# Patient Record
Sex: FEMALE | Race: BLACK OR AFRICAN AMERICAN | NOT HISPANIC OR LATINO
[De-identification: names, ages, dates, MRNs, and addresses within clinical notes are randomized per-mention and may not be internally consistent; named-entity substitution may affect disease eponyms.]

---

## 2017-06-09 ENCOUNTER — RESULT REVIEW (OUTPATIENT)
Age: 37
End: 2017-06-09

## 2019-05-30 ENCOUNTER — LABORATORY RESULT (OUTPATIENT)
Age: 39
End: 2019-05-30

## 2019-05-30 ENCOUNTER — APPOINTMENT (OUTPATIENT)
Dept: SURGERY | Facility: CLINIC | Age: 39
End: 2019-05-30
Payer: COMMERCIAL

## 2019-05-30 VITALS
BODY MASS INDEX: 28.49 KG/M2 | TEMPERATURE: 97.9 F | HEART RATE: 66 BPM | SYSTOLIC BLOOD PRESSURE: 117 MMHG | WEIGHT: 188 LBS | DIASTOLIC BLOOD PRESSURE: 78 MMHG | HEIGHT: 68 IN

## 2019-05-30 DIAGNOSIS — D64.9 ANEMIA, UNSPECIFIED: ICD-10-CM

## 2019-05-30 DIAGNOSIS — Z78.9 OTHER SPECIFIED HEALTH STATUS: ICD-10-CM

## 2019-05-30 DIAGNOSIS — Z80.3 FAMILY HISTORY OF MALIGNANT NEOPLASM OF BREAST: ICD-10-CM

## 2019-05-30 DIAGNOSIS — K80.20 CALCULUS OF GALLBLADDER W/OUT CHOLECYSTITIS W/OUT OBSTRUCTION: ICD-10-CM

## 2019-05-30 PROCEDURE — 99243 OFF/OP CNSLTJ NEW/EST LOW 30: CPT

## 2019-05-30 NOTE — HISTORY OF PRESENT ILLNESS
[de-identified] : 39 y.o F presents w the cc of having abdominal, epigastric discomfort. Patient states she had this pain, on and off over the past 10 years. She is also experiencing\par nausea, and vomiting after eating meals. Patient had an Abdominal US done, last year in 2018 which had showed cholelithiasis.

## 2019-05-30 NOTE — PLAN
[FreeTextEntry1] : Patient with symptomatic gallstones and abdominal pain. Had a long d/w the pt. All the options, benefits and risks of laparoscopic cholecystectomy was discussed. The potential to go open, the small potential for bleeding, CBD injury, bile leak and other related complications were discussed. All the questions were answered to her satisfaction. Will schedule at [facility] [place]\par Also has an umbilical hernia and can be repaired at the same time

## 2019-05-30 NOTE — REVIEW OF SYSTEMS
[Fever] : no fever [Chills] : no chills [Nosebleeds] : no nosebleeds [Chest Pain] : no chest pain [Cough] : no cough [Pelvic Pain] : no pelvic pain [Joint Swelling] : no joint swelling [Joint Stiffness] : no joint stiffness [Skin Lesions] : no skin lesions [Skin Wound] : no skin wound [Dizziness] : no dizziness [Anxiety] : no anxiety [Muscle Weakness] : no muscle weakness [Swollen Glands] : no swollen glands [FreeTextEntry7] : has abdominal pain with nausea and vomiting after eating meals , on and off over the last 10 years

## 2019-05-30 NOTE — CONSULT LETTER
[Dear  ___] : Dear  [unfilled], [Consult Closing:] : Thank you very much for allowing me to participate in the care of this patient.  If you have any questions, please do not hesitate to contact me. [Sincerely,] : Sincerely, [FreeTextEntry3] : Washington Andino MD\par  nose with dressing and packing

## 2019-05-30 NOTE — PHYSICAL EXAM
[Normal Breath Sounds] : Normal breath sounds [Normal Rate and Rhythm] : normal rate and rhythm [No Rash or Lesion] : No rash or lesion [Alert] : alert [Oriented to Person] : oriented to person [Oriented to Place] : oriented to place [Oriented to Time] : oriented to time [Calm] : calm [de-identified] : A/Ox3; NAD. appears comfortable  [de-identified] : EOMI [de-identified] : supple, no JVD [de-identified] : abd is soft, NT/ND; she has a small umbilical hernia  [de-identified] : no LE edema

## 2019-06-05 ENCOUNTER — OUTPATIENT (OUTPATIENT)
Dept: OUTPATIENT SERVICES | Facility: HOSPITAL | Age: 39
LOS: 1 days | End: 2019-06-05
Payer: COMMERCIAL

## 2019-06-05 VITALS
HEART RATE: 67 BPM | DIASTOLIC BLOOD PRESSURE: 70 MMHG | SYSTOLIC BLOOD PRESSURE: 110 MMHG | WEIGHT: 186.95 LBS | RESPIRATION RATE: 16 BRPM | OXYGEN SATURATION: 98 % | HEIGHT: 68 IN | TEMPERATURE: 98 F

## 2019-06-05 DIAGNOSIS — Z01.818 ENCOUNTER FOR OTHER PREPROCEDURAL EXAMINATION: ICD-10-CM

## 2019-06-05 DIAGNOSIS — K80.20 CALCULUS OF GALLBLADDER WITHOUT CHOLECYSTITIS WITHOUT OBSTRUCTION: ICD-10-CM

## 2019-06-05 DIAGNOSIS — K42.9 UMBILICAL HERNIA WITHOUT OBSTRUCTION OR GANGRENE: ICD-10-CM

## 2019-06-05 LAB — HCG UR QL: NEGATIVE — SIGNIFICANT CHANGE UP

## 2019-06-05 PROCEDURE — 71046 X-RAY EXAM CHEST 2 VIEWS: CPT | Mod: 26

## 2019-06-05 PROCEDURE — 93005 ELECTROCARDIOGRAM TRACING: CPT

## 2019-06-05 PROCEDURE — G0463: CPT

## 2019-06-05 PROCEDURE — 81025 URINE PREGNANCY TEST: CPT

## 2019-06-05 PROCEDURE — 71046 X-RAY EXAM CHEST 2 VIEWS: CPT

## 2019-06-05 RX ORDER — SODIUM CHLORIDE 9 MG/ML
3 INJECTION INTRAMUSCULAR; INTRAVENOUS; SUBCUTANEOUS EVERY 8 HOURS
Refills: 0 | Status: DISCONTINUED | OUTPATIENT
Start: 2019-06-12 | End: 2019-06-20

## 2019-06-05 NOTE — H&P PST ADULT - NSANTHOSAYNRD_GEN_A_CORE
No. DORCAS screening performed.  STOP BANG Legend: 0-2 = LOW Risk; 3-4 = INTERMEDIATE Risk; 5-8 = HIGH Risk

## 2019-06-05 NOTE — H&P PST ADULT - MUSCULOSKELETAL
details… detailed exam no joint erythema/no calf tenderness/no joint warmth/no joint swelling/normal strength/ROM intact/normal

## 2019-06-05 NOTE — H&P PST ADULT - NEGATIVE GENERAL GENITOURINARY SYMPTOMS
no nocturia/no renal colic/no flank pain L/no gas in urine/no flank pain R/no urine discoloration/no dysuria/no incontinence/no bladder infections/no urinary hesitancy/no hematuria/normal urinary frequency

## 2019-06-05 NOTE — H&P PST ADULT - NEGATIVE CARDIOVASCULAR SYMPTOMS
no chest pain/no palpitations/no dyspnea on exertion/no orthopnea/no paroxysmal nocturnal dyspnea/no claudication/no peripheral edema

## 2019-06-05 NOTE — H&P PST ADULT - NEGATIVE BREAST SYMPTOMS
no nipple discharge R/no breast lump L/no breast lump R/no breast tenderness R/no nipple discharge L/no breast tenderness L

## 2019-06-05 NOTE — H&P PST ADULT - ASSESSMENT
39 years old female presented  with Calculus of gallbladder without cholecystitis without obstruction, Umbilical hernia without obstruction and without gangrene.

## 2019-06-05 NOTE — H&P PST ADULT - NSICDXPASTMEDICALHX_GEN_ALL_CORE_FT
PAST MEDICAL HISTORY:  Anemia hx of    Breast cyst, right hx of    Calculus of gallbladder without cholecystitis without obstruction     Hemorrhoids     History of hyperprolactinemia prolactin level - normal    Positive PPD, treated 10 years ago, pt had BCG vaccine in her country in childhood    Umbilical hernia without obstruction and without gangrene

## 2019-06-05 NOTE — H&P PST ADULT - RS GEN PE MLT RESP DETAILS PC
clear to auscultation bilaterally/airway patent/no rhonchi/no rales/breath sounds equal/good air movement/no wheezes/respirations non-labored

## 2019-06-05 NOTE — H&P PST ADULT - HISTORY OF PRESENT ILLNESS
39 years old female presented to [facility] for evaluation before surgery, pt was diagnosed with Calculus of gallbladder without cholecystitis without obstruction, Umbilical hernia without obstruction and without gangrene and is scheduled for laparoscopic cholecystectomy with repair of umbilical hernia on 6/12/19.

## 2019-06-05 NOTE — H&P PST ADULT - GASTROINTESTINAL COMMENTS
occasionally RUQ abdominal pain after eating, currently no pain, and umbilical area swelling umbilical hernia , soft, no tender, no RUQ abdominal tenderness, sx dx calculus of gallbladder

## 2019-06-05 NOTE — H&P PST ADULT - NEGATIVE ALLERGY TYPES
no outdoor environmental allergies/no indoor environmental allergies/no reactions to animals/no reactions to food/no reactions to insect bites

## 2019-06-05 NOTE — H&P PST ADULT - NEGATIVE OPHTHALMOLOGIC SYMPTOMS
no diplopia/no photophobia/no blurred vision R/no pain L/no pain R/no discharge L/no lacrimation L/no lacrimation R/no discharge R/no blurred vision L

## 2019-06-05 NOTE — H&P PST ADULT - NSICDXPROBLEM_GEN_ALL_CORE_FT
PROBLEM DIAGNOSES  Problem: Calculus of gallbladder without cholecystitis without obstruction  Assessment and Plan: Patient  is scheduled for laparoscopic cholecystectomy with repair of umbilical hernia on 6/12/19.     Problem: Umbilical hernia without obstruction and without gangrene  Assessment and Plan: Patient  is scheduled for laparoscopic cholecystectomy with repair of umbilical hernia on 6/12/19.

## 2019-06-05 NOTE — H&P PST ADULT - RESPIRATORY AND THORAX COMMENTS
Ruba Darling RN   Plan last modified Ruba Darling Dacia Ruiz MD [facility] [phone]            See the Encounter Report to view Anticoagulation Flowsheet and Dosing Calendar (Go to Encounters tab in chart review, and find the Anticoagulation Therapy Visit)    Dosage adjustment made based on physician directed care plan.    Ruba Darling RN                 
hx of PPD positive with tx done 10 years ago ( BCG vaccinations in pt's country), no cough, fatigue, no night sweating

## 2019-06-05 NOTE — H&P PST ADULT - NEGATIVE MUSCULOSKELETAL SYMPTOMS
no myalgia/no stiffness/no joint swelling/no muscle weakness/no arthritis/no arthralgia/no muscle cramps

## 2019-06-05 NOTE — H&P PST ADULT - NEGATIVE NEUROLOGICAL SYMPTOMS
no weakness/no syncope/no vertigo/no facial palsy/no transient paralysis/no paresthesias/no difficulty walking/no confusion/no focal seizures/no generalized seizures/no tremors/no loss of sensation/no loss of consciousness/no hemiparesis/no headache

## 2019-06-06 PROBLEM — Z78.9 NO PERTINENT PAST SURGICAL HISTORY: Status: RESOLVED | Noted: 2019-05-30 | Resolved: 2019-06-06

## 2019-06-06 PROBLEM — Z80.3 FAMILY HISTORY OF BREAST CANCER: Status: ACTIVE | Noted: 2019-05-30

## 2019-06-06 PROBLEM — D64.9 ANEMIA: Status: ACTIVE | Noted: 2019-05-30

## 2019-06-11 ENCOUNTER — TRANSCRIPTION ENCOUNTER (OUTPATIENT)
Age: 39
End: 2019-06-11

## 2019-06-12 ENCOUNTER — OUTPATIENT (OUTPATIENT)
Dept: OUTPATIENT SERVICES | Facility: HOSPITAL | Age: 39
LOS: 1 days | End: 2019-06-12
Payer: COMMERCIAL

## 2019-06-12 ENCOUNTER — APPOINTMENT (OUTPATIENT)
Dept: SURGERY | Facility: HOSPITAL | Age: 39
End: 2019-06-12

## 2019-06-12 ENCOUNTER — RESULT REVIEW (OUTPATIENT)
Age: 39
End: 2019-06-12

## 2019-06-12 VITALS
HEART RATE: 66 BPM | OXYGEN SATURATION: 99 % | RESPIRATION RATE: 17 BRPM | DIASTOLIC BLOOD PRESSURE: 67 MMHG | SYSTOLIC BLOOD PRESSURE: 143 MMHG | TEMPERATURE: 97 F

## 2019-06-12 VITALS
SYSTOLIC BLOOD PRESSURE: 116 MMHG | HEIGHT: 68 IN | WEIGHT: 186.95 LBS | HEART RATE: 94 BPM | OXYGEN SATURATION: 100 % | RESPIRATION RATE: 14 BRPM | TEMPERATURE: 98 F | DIASTOLIC BLOOD PRESSURE: 70 MMHG

## 2019-06-12 DIAGNOSIS — K42.9 UMBILICAL HERNIA WITHOUT OBSTRUCTION OR GANGRENE: ICD-10-CM

## 2019-06-12 DIAGNOSIS — K80.20 CALCULUS OF GALLBLADDER WITHOUT CHOLECYSTITIS WITHOUT OBSTRUCTION: ICD-10-CM

## 2019-06-12 LAB
BLD GP AB SCN SERPL QL: SIGNIFICANT CHANGE UP
HCG UR QL: NEGATIVE — SIGNIFICANT CHANGE UP

## 2019-06-12 PROCEDURE — 81025 URINE PREGNANCY TEST: CPT

## 2019-06-12 PROCEDURE — 86850 RBC ANTIBODY SCREEN: CPT

## 2019-06-12 PROCEDURE — 47562 LAPAROSCOPIC CHOLECYSTECTOMY: CPT | Mod: AS

## 2019-06-12 PROCEDURE — 88304 TISSUE EXAM BY PATHOLOGIST: CPT | Mod: 26

## 2019-06-12 PROCEDURE — 86901 BLOOD TYPING SEROLOGIC RH(D): CPT

## 2019-06-12 PROCEDURE — 47562 LAPAROSCOPIC CHOLECYSTECTOMY: CPT

## 2019-06-12 PROCEDURE — 36415 COLL VENOUS BLD VENIPUNCTURE: CPT

## 2019-06-12 PROCEDURE — 86900 BLOOD TYPING SEROLOGIC ABO: CPT

## 2019-06-12 PROCEDURE — 88304 TISSUE EXAM BY PATHOLOGIST: CPT

## 2019-06-12 PROCEDURE — 49585: CPT | Mod: AS

## 2019-06-12 PROCEDURE — 49585: CPT | Mod: 59

## 2019-06-12 RX ORDER — METOCLOPRAMIDE HCL 10 MG
10 TABLET ORAL ONCE
Refills: 0 | Status: COMPLETED | OUTPATIENT
Start: 2019-06-12 | End: 2019-06-12

## 2019-06-12 RX ORDER — KETOROLAC TROMETHAMINE 30 MG/ML
30 SYRINGE (ML) INJECTION EVERY 6 HOURS
Refills: 0 | Status: DISCONTINUED | OUTPATIENT
Start: 2019-06-12 | End: 2019-06-12

## 2019-06-12 RX ORDER — HYDROMORPHONE HYDROCHLORIDE 2 MG/ML
0.5 INJECTION INTRAMUSCULAR; INTRAVENOUS; SUBCUTANEOUS
Refills: 0 | Status: DISCONTINUED | OUTPATIENT
Start: 2019-06-12 | End: 2019-06-12

## 2019-06-12 RX ORDER — SODIUM CHLORIDE 9 MG/ML
1000 INJECTION, SOLUTION INTRAVENOUS
Refills: 0 | Status: DISCONTINUED | OUTPATIENT
Start: 2019-06-12 | End: 2019-06-12

## 2019-06-12 RX ORDER — KETOROLAC TROMETHAMINE 30 MG/ML
1 SYRINGE (ML) INJECTION
Qty: 20 | Refills: 0
Start: 2019-06-12 | End: 2019-06-16

## 2019-06-12 RX ADMIN — Medication 10 MILLIGRAM(S): at 09:59

## 2019-06-12 NOTE — ASU PATIENT PROFILE, ADULT - PMH
Anemia  hx of  Breast cyst, right  hx of  Calculus of gallbladder without cholecystitis without obstruction    Hemorrhoids    History of hyperprolactinemia  prolactin level - normal  Positive PPD, treated  10 years ago, pt had BCG vaccine [place] in childhood  Umbilical hernia without obstruction and without gangrene

## 2019-06-12 NOTE — ASU DISCHARGE PLAN (ADULT/PEDIATRIC) - NURSING INSTRUCTIONS
Keep dressing clean, dry, and intact for 48 hrs. Shower after 2 days. Remove dressing after shower. Do not touch steri-strips, it will fall off by itself.

## 2019-06-12 NOTE — ASU DISCHARGE PLAN (ADULT/PEDIATRIC) - CALL YOUR DOCTOR IF YOU HAVE ANY OF THE FOLLOWING:
Fever greater than (need to indicate Fahrenheit or Celsius)/Bleeding that does not stop/Swelling that gets worse

## 2019-06-12 NOTE — ASU DISCHARGE PLAN (ADULT/PEDIATRIC) - CARE PROVIDER_API CALL
Washington Andino (MD)  Surgery  [address], [place] [id number]  Phone: [phone]  Fax: [phone]  Follow Up Time:

## 2019-06-17 LAB — SURGICAL PATHOLOGY STUDY: SIGNIFICANT CHANGE UP

## 2019-06-21 PROBLEM — R76.11 NONSPECIFIC REACTION TO TUBERCULIN SKIN TEST WITHOUT ACTIVE TUBERCULOSIS: Chronic | Status: ACTIVE | Noted: 2019-06-05

## 2019-06-21 PROBLEM — K64.9 UNSPECIFIED HEMORRHOIDS: Chronic | Status: ACTIVE | Noted: 2019-06-05

## 2019-06-21 PROBLEM — Z86.39 PERSONAL HISTORY OF OTHER ENDOCRINE, NUTRITIONAL AND METABOLIC DISEASE: Chronic | Status: ACTIVE | Noted: 2019-06-05

## 2019-06-21 PROBLEM — K42.9 UMBILICAL HERNIA WITHOUT OBSTRUCTION OR GANGRENE: Chronic | Status: ACTIVE | Noted: 2019-06-05

## 2019-06-21 PROBLEM — D64.9 ANEMIA, UNSPECIFIED: Chronic | Status: ACTIVE | Noted: 2019-06-05

## 2019-06-21 PROBLEM — K80.20 CALCULUS OF GALLBLADDER WITHOUT CHOLECYSTITIS WITHOUT OBSTRUCTION: Chronic | Status: ACTIVE | Noted: 2019-06-05

## 2019-06-21 PROBLEM — N60.01 SOLITARY CYST OF RIGHT BREAST: Chronic | Status: ACTIVE | Noted: 2019-06-05

## 2019-06-27 ENCOUNTER — APPOINTMENT (OUTPATIENT)
Dept: SURGERY | Facility: CLINIC | Age: 39
End: 2019-06-27
Payer: COMMERCIAL

## 2019-06-27 PROCEDURE — 99024 POSTOP FOLLOW-UP VISIT: CPT

## 2019-06-27 NOTE — HISTORY OF PRESENT ILLNESS
[de-identified] : 39 y.o F s/p laparoscopic cholecystectomy and repair of repair of incisional hernia, 06/12/19. Path results c/w chronic calculous cholecystitis.

## 2019-06-27 NOTE — CONSULT LETTER
[Dear  ___] : Dear  [unfilled], [Consult Letter:] : I had the pleasure of evaluating your patient, [unfilled]. [Consult Closing:] : Thank you very much for allowing me to participate in the care of this patient.  If you have any questions, please do not hesitate to contact me. [Sincerely,] : Sincerely, [FreeTextEntry3] : Washington Andino MD\par

## 2019-06-27 NOTE — REASON FOR VISIT
[Post Op: _________] : a [unfilled] post op visit [FreeTextEntry1] : s/p laparoscopic cholecystectomy and repair of umbilical hernia, 06/12/19

## 2019-10-18 NOTE — ASU PREOP CHECKLIST - HAND OFF
[>50% of Time Spent on Counseling and Coordination of Care for  ___] : Greater than 50% of the encounter time was spent on counseling and coordination of care for [unfilled] [Time Spent: ___ minutes] : I have spent [unfilled] minutes of face to face time with the patient yes

## 2022-09-17 NOTE — ASU DISCHARGE PLAN (ADULT/PEDIATRIC) - FOLLOW UP APPOINTMENTS
The patient is a 34y year old Male complaining of flank pain.
911 or go to the nearest Emergency Room/[facility]

## 2023-07-09 NOTE — ASSESSMENT
[FreeTextEntry1] : Patient with symptomatic gallstones and abdominal pain. Had a long d/w the pt. All the options, benefits and risks of laparoscopic cholecystectomy was discussed. The potential to go open, the small potential for bleeding, CBD injury, bile leak and other related complications were discussed. All the questions were answered to her satisfaction. Will schedule at [facility] [place]\par \par 
No